# Patient Record
Sex: FEMALE | Race: WHITE
[De-identification: names, ages, dates, MRNs, and addresses within clinical notes are randomized per-mention and may not be internally consistent; named-entity substitution may affect disease eponyms.]

---

## 2017-03-17 ENCOUNTER — HOSPITAL ENCOUNTER (OUTPATIENT)
Dept: HOSPITAL 80 - BMCIMAGING | Age: 42
End: 2017-03-17
Attending: INTERNAL MEDICINE
Payer: COMMERCIAL

## 2017-03-17 DIAGNOSIS — J40: Primary | ICD-10-CM

## 2017-03-23 ENCOUNTER — HOSPITAL ENCOUNTER (OUTPATIENT)
Dept: HOSPITAL 80 - BRMIMAGING | Age: 42
End: 2017-03-23
Attending: INTERNAL MEDICINE
Payer: COMMERCIAL

## 2017-03-23 DIAGNOSIS — Z12.31: Primary | ICD-10-CM

## 2017-03-23 PROCEDURE — G0202 SCR MAMMO BI INCL CAD: HCPCS

## 2017-03-30 ENCOUNTER — HOSPITAL ENCOUNTER (OUTPATIENT)
Dept: HOSPITAL 80 - FIMAGING | Age: 42
End: 2017-03-30
Attending: INTERNAL MEDICINE
Payer: COMMERCIAL

## 2017-03-30 DIAGNOSIS — R92.8: Primary | ICD-10-CM

## 2017-03-30 PROCEDURE — G0206 DX MAMMO INCL CAD UNI: HCPCS

## 2019-05-01 ENCOUNTER — HOSPITAL ENCOUNTER (OUTPATIENT)
Dept: HOSPITAL 80 - FIMAGING | Age: 44
End: 2019-05-01
Attending: INTERNAL MEDICINE
Payer: COMMERCIAL

## 2019-05-01 DIAGNOSIS — Z12.31: Primary | ICD-10-CM

## 2020-12-03 ENCOUNTER — TRANSFERRED RECORDS (OUTPATIENT)
Dept: HEALTH INFORMATION MANAGEMENT | Facility: CLINIC | Age: 45
End: 2020-12-03

## 2020-12-09 ENCOUNTER — TRANSFERRED RECORDS (OUTPATIENT)
Dept: HEALTH INFORMATION MANAGEMENT | Facility: CLINIC | Age: 45
End: 2020-12-09

## 2021-07-29 ENCOUNTER — OFFICE VISIT (OUTPATIENT)
Dept: CARDIOLOGY | Facility: CLINIC | Age: 46
End: 2021-07-29
Payer: COMMERCIAL

## 2021-07-29 ENCOUNTER — TELEPHONE (OUTPATIENT)
Dept: CARDIOLOGY | Facility: CLINIC | Age: 46
End: 2021-07-29

## 2021-07-29 VITALS
HEART RATE: 69 BPM | WEIGHT: 171 LBS | DIASTOLIC BLOOD PRESSURE: 74 MMHG | BODY MASS INDEX: 29.19 KG/M2 | SYSTOLIC BLOOD PRESSURE: 114 MMHG | HEIGHT: 64 IN

## 2021-07-29 DIAGNOSIS — R00.2 PALPITATIONS: Primary | ICD-10-CM

## 2021-07-29 PROCEDURE — 93000 ELECTROCARDIOGRAM COMPLETE: CPT | Performed by: INTERNAL MEDICINE

## 2021-07-29 PROCEDURE — 99204 OFFICE O/P NEW MOD 45 MIN: CPT | Performed by: INTERNAL MEDICINE

## 2021-07-29 RX ORDER — NADOLOL 20 MG/1
10 TABLET ORAL DAILY
COMMUNITY
End: 2021-07-29

## 2021-07-29 ASSESSMENT — MIFFLIN-ST. JEOR: SCORE: 1400.65

## 2021-07-29 NOTE — PROGRESS NOTES
Service Date: 07/29/2021    HISTORY OF PRESENT ILLNESS:  I saw Ms. Grey for establishment of cardiac care.  She is a 46-year-old white female who was working in [place] in the last 15 years.  She recently presented to her family physician with a report of anxiety episodes.  The patient does have a family history of coronary artery disease in her father.  Her primary care doctor referred her for a stress test.  During the treadmill test, she was found to have a nonsustained ventricular tachycardia.  For that reason, she was sent for Cardiology  consultation.  The cardiologist believed that her arrhythmia was unlikely to be catecholamine-dependent VT, but ended up having her going through genetic testing anyways.  Her genetic testing was negative for catecholamine-dependent VT.  Historically, she did not have syncope or near syncope.  She does not, in fact, feel any palpitations.  Her anxiety problem has resolved when she is using acupuncture at the present time.    She had echocardiography in [place] that was unremarkable.  She was discharged on nadolol and the patient has been complaining of severe fatigue.    Her past medical history is negative for any other chronic medical problems.  She does not smoke or abuse alcohol.    PHYSICAL EXAMINATION:    VITAL SIGNS:  Blood pressure was 114/74, heart rate 69 beats per minute, body weight 171 pounds.  HEENT:  The eyes and ENT were unremarkable.  LUNGS:  Clear.  CARDIAC:  Rhythm was regular and heart sounds were normal with no murmur.  ABDOMEN:  Abdominal exam showed mild obesity.  EXTREMITIES:  There was no pedal edema.    EKG showed normal sinus rhythm.    ASSESSMENT AND RECOMMENDATIONS:  The patient, Ms. Grey is a 46-year-old white female who had an exercise-induced, but nonsustained VT.  She has had negative genetic testing for catecholamine-dependent VT.  She has not had clinical symptoms of ventricular tachycardia.  Her anxiety symptoms have resolved with  acupuncture.  She has severe side effects from nadolol.  I have agreed for her to discontinue nadolol.  I ordered a repeat stress test in about 1 week.  It is my knowledge that the patient's repeat exercise testing in [place] while on nadolol did not show ventricular arrhythmia.  Per patient request and her previous plan, she will have a cardiac MRI for evaluation of the cardiac structure and function.  I plan to see her for followup in about 1-2 months.    Nicky Maldonado MD        D: 2021   T: 2021   MT: ELBERT    Name:     PATRICE HOPKINS  MRN:      [id number]        Account:      [id number]   :      1975           Service Date: 2021       Document: [id number]

## 2021-07-29 NOTE — LETTER
7/29/2021    Physician No Ref-Primary  No address on file    RE: Janet Grey       Dear Colleague,    I had the pleasure of seeing Janet Grey in the [facility].    HPI and Plan:   See dictation    Orders Placed This Encounter   Procedures     MRI Cardiac w/contrast     Follow-Up with Electrophysiologist     EKG 12-lead complete w/read - Clinics (future- to be scheduled)     Exercise Stress Test - Adult       Orders Placed This Encounter   Medications     DISCONTD: nadolol (CORGARD) 20 MG tablet     Sig: Take 10 mg by mouth daily       Medications Discontinued During This Encounter   Medication Reason     nadolol (CORGARD) 20 MG tablet          Encounter Diagnosis   Name Primary?     Palpitations Yes       CURRENT MEDICATIONS:  No current outpatient medications on file.       ALLERGIES   No Known Allergies    PAST MEDICAL HISTORY:  Past Medical History:   Diagnosis Date     Anxiety      Paroxysmal ventricular tachycardia (H)        PAST SURGICAL HISTORY:  No past surgical history on file.    FAMILY HISTORY:  Family History   Problem Relation Age of Onset     Coronary Artery Disease Father      Coronary Artery Disease Paternal Grandmother        SOCIAL HISTORY:  Social History     Socioeconomic History     Marital status: Single     Spouse name: None     Number of children: None     Years of education: None     Highest education level: None   Occupational History     None   Tobacco Use     Smoking status: Never Smoker     Smokeless tobacco: Never Used   Substance and Sexual Activity     Alcohol use: Yes     Alcohol/week: 6.0 standard drinks     Types: 6 Glasses of wine per week     Drug use: Never     Sexual activity: None   Other Topics Concern     None   Social History Narrative     None     Social Determinants of Health     Financial Resource Strain:      Difficulty of Paying Living Expenses:    Food Insecurity:      Worried About  "Running Out of Food in the Last Year:      Ran Out of Food in the Last Year:    Transportation Needs:      Lack of Transportation (Medical):      Lack of Transportation (Non-Medical):    Physical Activity:      Days of Exercise per Week:      Minutes of Exercise per Session:    Stress:      Feeling of Stress :    Social Connections:      Frequency of Communication with Friends and Family:      Frequency of Social Gatherings with Friends and Family:      Attends Temple Services:      Active Member of Clubs or Organizations:      Attends Club or Organization Meetings:      Marital Status:    Intimate Partner Violence:      Fear of Current or Ex-Partner:      Emotionally Abused:      Physically Abused:      Sexually Abused:        Review of Systems:  Skin:  Negative       Eyes:  Negative      ENT:  Negative      Respiratory:  Negative       Cardiovascular:    Positive for;heaviness    Gastroenterology: Positive for heartburn    Genitourinary:  Negative      Musculoskeletal:  Negative      Neurologic:  Negative      Psychiatric:  Positive for anxiety    Heme/Lymph/Imm:  Negative      Endocrine:  Negative        Physical Exam:  Vitals: /74   Pulse 69   Ht 1.626 m (5' 4\")   Wt 77.6 kg (171 lb)   BMI 29.35 kg/m      Constitutional:  cooperative, alert and oriented, well developed, well nourished, in no acute distress        Skin:  warm and dry to the touch, no apparent skin lesions or masses noted          Head:  normocephalic, no masses or lesions        Eyes:  pupils equal and round, conjunctivae and lids unremarkable, sclera white, no xanthalasma, EOMS intact, no nystagmus        Lymph:No Cervical lymphadenopathy present     ENT:  no pallor or cyanosis, dentition good        Neck:           Respiratory:  normal breath sounds, clear to auscultation, normal A-P diameter, normal symmetry, normal respiratory excursion, no use of accessory muscles         Cardiac: regular rhythm, normal S1/S2, no S3 or S4, " apical impulse not displaced, no murmurs, gallops or rubs                                                         GI:  abdomen soft, non-tender, BS normoactive, no mass, no HSM, no bruits        Extremities and Muscular Skeletal:  no deformities, clubbing, cyanosis, erythema observed              Neurological:  no gross motor deficits        Psych:  Alert and Oriented x 3        CC  No referring provider defined for this encounter.                  Thank you for allowing me to participate in the care of your patient.      Sincerely,     Nicky Maldonado MD     [facility]  cc:   No referring provider defined for this encounter.

## 2021-07-29 NOTE — TELEPHONE ENCOUNTER
Pt has OV today with Dr. Maldonado. Scheduling notes say OV is to discuss exercise induced VT. Pt previously seen at [facility] in [place]. Notes available in Care Everywhere, but we will need strips from her previous testing.     Sent stat fax to [facility] Records at [phone] asking for all cardiac testing WITH STRIPS in the past year, and specifically asked for stress test with strips from 12/2020, monitor results with strips from 12/2020, and possible repeat stress test from 6/2020, and possible cardiac MRI from 6/2020.

## 2021-07-29 NOTE — PROGRESS NOTES
HPI and Plan:   See dictation    Orders Placed This Encounter   Procedures     MRI Cardiac w/contrast     Follow-Up with Electrophysiologist     EKG 12-lead complete w/read - Clinics (future- to be scheduled)     Exercise Stress Test - Adult       Orders Placed This Encounter   Medications     DISCONTD: nadolol (CORGARD) 20 MG tablet     Sig: Take 10 mg by mouth daily       Medications Discontinued During This Encounter   Medication Reason     nadolol (CORGARD) 20 MG tablet          Encounter Diagnosis   Name Primary?     Palpitations Yes       CURRENT MEDICATIONS:  No current outpatient medications on file.       ALLERGIES   No Known Allergies    PAST MEDICAL HISTORY:  Past Medical History:   Diagnosis Date     Anxiety      Paroxysmal ventricular tachycardia (H)        PAST SURGICAL HISTORY:  No past surgical history on file.    FAMILY HISTORY:  Family History   Problem Relation Age of Onset     Coronary Artery Disease Father      Coronary Artery Disease Paternal Grandmother        SOCIAL HISTORY:  Social History     Socioeconomic History     Marital status: Single     Spouse name: None     Number of children: None     Years of education: None     Highest education level: None   Occupational History     None   Tobacco Use     Smoking status: Never Smoker     Smokeless tobacco: Never Used   Substance and Sexual Activity     Alcohol use: Yes     Alcohol/week: 6.0 standard drinks     Types: 6 Glasses of wine per week     Drug use: Never     Sexual activity: None   Other Topics Concern     None   Social History Narrative     None     Social Determinants of Health     Financial Resource Strain:      Difficulty of Paying Living Expenses:    Food Insecurity:      Worried About Running Out of Food in the Last Year:      Ran Out of Food in the Last Year:    Transportation Needs:      Lack of Transportation (Medical):      Lack of Transportation (Non-Medical):    Physical Activity:      Days of Exercise per Week:       "Minutes of Exercise per Session:    Stress:      Feeling of Stress :    Social Connections:      Frequency of Communication with Friends and Family:      Frequency of Social Gatherings with Friends and Family:      Attends Zoroastrianism Services:      Active Member of Clubs or Organizations:      Attends Club or Organization Meetings:      Marital Status:    Intimate Partner Violence:      Fear of Current or Ex-Partner:      Emotionally Abused:      Physically Abused:      Sexually Abused:        Review of Systems:  Skin:  Negative       Eyes:  Negative      ENT:  Negative      Respiratory:  Negative       Cardiovascular:    Positive for;heaviness    Gastroenterology: Positive for heartburn    Genitourinary:  Negative      Musculoskeletal:  Negative      Neurologic:  Negative      Psychiatric:  Positive for anxiety    Heme/Lymph/Imm:  Negative      Endocrine:  Negative        Physical Exam:  Vitals: /74   Pulse 69   Ht 1.626 m (5' 4\")   Wt 77.6 kg (171 lb)   BMI 29.35 kg/m      Constitutional:  cooperative, alert and oriented, well developed, well nourished, in no acute distress        Skin:  warm and dry to the touch, no apparent skin lesions or masses noted          Head:  normocephalic, no masses or lesions        Eyes:  pupils equal and round, conjunctivae and lids unremarkable, sclera white, no xanthalasma, EOMS intact, no nystagmus        Lymph:No Cervical lymphadenopathy present     ENT:  no pallor or cyanosis, dentition good        Neck:           Respiratory:  normal breath sounds, clear to auscultation, normal A-P diameter, normal symmetry, normal respiratory excursion, no use of accessory muscles         Cardiac: regular rhythm, normal S1/S2, no S3 or S4, apical impulse not displaced, no murmurs, gallops or rubs                                                         GI:  abdomen soft, non-tender, BS normoactive, no mass, no HSM, no bruits        Extremities and Muscular Skeletal:  no deformities, " clubbing, cyanosis, erythema observed              Neurological:  no gross motor deficits        Psych:  Alert and Oriented x 3        CC  No referring provider defined for this encounter.

## 2021-07-30 ENCOUNTER — TELEPHONE (OUTPATIENT)
Dept: CARDIOLOGY | Facility: CLINIC | Age: 46
End: 2021-07-30

## 2021-07-30 NOTE — TELEPHONE ENCOUNTER
Patient called needing clarification regarding stopping nadolol.  Reviewed OV from 7/29 and patient is to discontinue medication no taper needed.  CHARLIE Felix

## 2021-08-06 ENCOUNTER — HOSPITAL ENCOUNTER (OUTPATIENT)
Dept: CARDIOLOGY | Facility: CLINIC | Age: 46
Discharge: HOME | End: 2021-08-06
Attending: INTERNAL MEDICINE | Admitting: INTERNAL MEDICINE
Payer: COMMERCIAL

## 2021-08-06 DIAGNOSIS — R00.2 PALPITATIONS: ICD-10-CM

## 2021-08-06 PROCEDURE — 93018 CV STRESS TEST I&R ONLY: CPT | Performed by: INTERNAL MEDICINE

## 2021-08-06 PROCEDURE — 93016 CV STRESS TEST SUPVJ ONLY: CPT | Performed by: INTERNAL MEDICINE

## 2021-08-06 PROCEDURE — 93017 CV STRESS TEST TRACING ONLY: CPT

## 2021-08-11 ENCOUNTER — HOSPITAL ENCOUNTER (OUTPATIENT)
Dept: CARDIOLOGY | Facility: CLINIC | Age: 46
Discharge: HOME | End: 2021-08-11
Attending: INTERNAL MEDICINE | Admitting: INTERNAL MEDICINE
Payer: COMMERCIAL

## 2021-08-11 DIAGNOSIS — R00.2 PALPITATIONS: ICD-10-CM

## 2021-08-11 PROCEDURE — 255N000002 HC RX 255 OP 636: Performed by: INTERNAL MEDICINE

## 2021-08-11 PROCEDURE — 75561 CARDIAC MRI FOR MORPH W/DYE: CPT | Mod: 26 | Performed by: INTERNAL MEDICINE

## 2021-08-11 PROCEDURE — 75561 CARDIAC MRI FOR MORPH W/DYE: CPT

## 2021-08-11 PROCEDURE — A9585 GADOBUTROL INJECTION: HCPCS | Performed by: INTERNAL MEDICINE

## 2021-08-11 RX ORDER — GADOBUTROL 604.72 MG/ML
10 INJECTION INTRAVENOUS ONCE
Status: COMPLETED | OUTPATIENT
Start: 2021-08-11 | End: 2021-08-11

## 2021-08-11 RX ADMIN — GADOBUTROL 10 ML: 604.72 INJECTION INTRAVENOUS at 09:29

## 2021-08-31 ENCOUNTER — OFFICE VISIT (OUTPATIENT)
Dept: CARDIOLOGY | Facility: CLINIC | Age: 46
End: 2021-08-31
Payer: COMMERCIAL

## 2021-08-31 VITALS
HEIGHT: 64 IN | BODY MASS INDEX: 29.37 KG/M2 | WEIGHT: 172 LBS | DIASTOLIC BLOOD PRESSURE: 82 MMHG | HEART RATE: 62 BPM | SYSTOLIC BLOOD PRESSURE: 117 MMHG

## 2021-08-31 DIAGNOSIS — R00.2 PALPITATIONS: ICD-10-CM

## 2021-08-31 PROCEDURE — 99213 OFFICE O/P EST LOW 20 MIN: CPT | Performed by: INTERNAL MEDICINE

## 2021-08-31 ASSESSMENT — MIFFLIN-ST. JEOR: SCORE: 1405.19

## 2021-08-31 NOTE — LETTER
8/31/2021    Physician No Ref-Primary  No address on file    RE: Janet Grey       Dear Colleague,    I had the pleasure of seeing Janet Grey in the [facility].    HPI and Plan:   See dictation    No orders of the defined types were placed in this encounter.      No orders of the defined types were placed in this encounter.      There are no discontinued medications.      Encounter Diagnosis   Name Primary?     Palpitations        CURRENT MEDICATIONS:  No current outpatient medications on file.       ALLERGIES   No Known Allergies    PAST MEDICAL HISTORY:  Past Medical History:   Diagnosis Date     Anxiety      Paroxysmal ventricular tachycardia (H)        PAST SURGICAL HISTORY:  No past surgical history on file.    FAMILY HISTORY:  Family History   Problem Relation Age of Onset     Coronary Artery Disease Father      Coronary Artery Disease Paternal Grandmother        SOCIAL HISTORY:  Social History     Socioeconomic History     Marital status: Single     Spouse name: None     Number of children: None     Years of education: None     Highest education level: None   Occupational History     None   Tobacco Use     Smoking status: Never Smoker     Smokeless tobacco: Never Used   Substance and Sexual Activity     Alcohol use: Yes     Alcohol/week: 6.0 standard drinks     Types: 6 Glasses of wine per week     Drug use: Never     Sexual activity: None   Other Topics Concern     None   Social History Narrative     None     Social Determinants of Health     Financial Resource Strain:      Difficulty of Paying Living Expenses:    Food Insecurity:      Worried About Running Out of Food in the Last Year:      Ran Out of Food in the Last Year:    Transportation Needs:      Lack of Transportation (Medical):      Lack of Transportation (Non-Medical):    Physical Activity:      Days of Exercise per Week:      Minutes of Exercise per Session:   "  Stress:      Feeling of Stress :    Social Connections:      Frequency of Communication with Friends and Family:      Frequency of Social Gatherings with Friends and Family:      Attends Latter-day Services:      Active Member of Clubs or Organizations:      Attends Club or Organization Meetings:      Marital Status:    Intimate Partner Violence:      Fear of Current or Ex-Partner:      Emotionally Abused:      Physically Abused:      Sexually Abused:        Review of Systems:  Skin:  Negative       Eyes:  Negative      ENT:  Negative      Respiratory:  Negative       Cardiovascular:    Positive for;heaviness    Gastroenterology: Positive for heartburn    Genitourinary:  Negative      Musculoskeletal:  Negative      Neurologic:  Negative      Psychiatric:  Positive for anxiety    Heme/Lymph/Imm:  Negative      Endocrine:  Negative        Physical Exam:  Vitals: /82   Pulse 62   Ht 1.626 m (5' 4\")   Wt 78 kg (172 lb)   BMI 29.52 kg/m      Constitutional:  cooperative, alert and oriented, well developed, well nourished, in no acute distress        Skin:  warm and dry to the touch, no apparent skin lesions or masses noted          Head:  normocephalic, no masses or lesions        Eyes:  pupils equal and round, conjunctivae and lids unremarkable, sclera white, no xanthalasma, EOMS intact, no nystagmus        Lymph:No Cervical lymphadenopathy present     ENT:  no pallor or cyanosis, dentition good        Neck:           Respiratory:  normal breath sounds, clear to auscultation, normal A-P diameter, normal symmetry, normal respiratory excursion, no use of accessory muscles         Cardiac: regular rhythm, normal S1/S2, no S3 or S4, apical impulse not displaced, no murmurs, gallops or rubs                                                         GI:  abdomen soft, non-tender, BS normoactive, no mass, no HSM, no bruits        Extremities and Muscular Skeletal:  no deformities, clubbing, cyanosis, erythema observed "              Neurological:  no gross motor deficits        Psych:  Alert and Oriented x 3        CC  Nicky Maldonado MD  [address]  [address]  [place] [id number]                  Thank you for allowing me to participate in the care of your patient.      Sincerely,     Nicky Maldonado MD     [facility]  cc:   Nicky Maldonado MD  [address]  [address]  [place] [id number]

## 2021-08-31 NOTE — PROGRESS NOTES
HPI and Plan:   See dictation    No orders of the defined types were placed in this encounter.      No orders of the defined types were placed in this encounter.      There are no discontinued medications.      Encounter Diagnosis   Name Primary?     Palpitations        CURRENT MEDICATIONS:  No current outpatient medications on file.       ALLERGIES   No Known Allergies    PAST MEDICAL HISTORY:  Past Medical History:   Diagnosis Date     Anxiety      Paroxysmal ventricular tachycardia (H)        PAST SURGICAL HISTORY:  No past surgical history on file.    FAMILY HISTORY:  Family History   Problem Relation Age of Onset     Coronary Artery Disease Father      Coronary Artery Disease Paternal Grandmother        SOCIAL HISTORY:  Social History     Socioeconomic History     Marital status: Single     Spouse name: None     Number of children: None     Years of education: None     Highest education level: None   Occupational History     None   Tobacco Use     Smoking status: Never Smoker     Smokeless tobacco: Never Used   Substance and Sexual Activity     Alcohol use: Yes     Alcohol/week: 6.0 standard drinks     Types: 6 Glasses of wine per week     Drug use: Never     Sexual activity: None   Other Topics Concern     None   Social History Narrative     None     Social Determinants of Health     Financial Resource Strain:      Difficulty of Paying Living Expenses:    Food Insecurity:      Worried About Running Out of Food in the Last Year:      Ran Out of Food in the Last Year:    Transportation Needs:      Lack of Transportation (Medical):      Lack of Transportation (Non-Medical):    Physical Activity:      Days of Exercise per Week:      Minutes of Exercise per Session:    Stress:      Feeling of Stress :    Social Connections:      Frequency of Communication with Friends and Family:      Frequency of Social Gatherings with Friends and Family:      Attends Uatsdin Services:      Active Member of Clubs or  "Organizations:      Attends Club or Organization Meetings:      Marital Status:    Intimate Partner Violence:      Fear of Current or Ex-Partner:      Emotionally Abused:      Physically Abused:      Sexually Abused:        Review of Systems:  Skin:  Negative       Eyes:  Negative      ENT:  Negative      Respiratory:  Negative       Cardiovascular:    Positive for;heaviness    Gastroenterology: Positive for heartburn    Genitourinary:  Negative      Musculoskeletal:  Negative      Neurologic:  Negative      Psychiatric:  Positive for anxiety    Heme/Lymph/Imm:  Negative      Endocrine:  Negative        Physical Exam:  Vitals: /82   Pulse 62   Ht 1.626 m (5' 4\")   Wt 78 kg (172 lb)   BMI 29.52 kg/m      Constitutional:  cooperative, alert and oriented, well developed, well nourished, in no acute distress        Skin:  warm and dry to the touch, no apparent skin lesions or masses noted          Head:  normocephalic, no masses or lesions        Eyes:  pupils equal and round, conjunctivae and lids unremarkable, sclera white, no xanthalasma, EOMS intact, no nystagmus        Lymph:No Cervical lymphadenopathy present     ENT:  no pallor or cyanosis, dentition good        Neck:           Respiratory:  normal breath sounds, clear to auscultation, normal A-P diameter, normal symmetry, normal respiratory excursion, no use of accessory muscles         Cardiac: regular rhythm, normal S1/S2, no S3 or S4, apical impulse not displaced, no murmurs, gallops or rubs                                                         GI:  abdomen soft, non-tender, BS normoactive, no mass, no HSM, no bruits        Extremities and Muscular Skeletal:  no deformities, clubbing, cyanosis, erythema observed              Neurological:  no gross motor deficits        Psych:  Alert and Oriented x 3        CC  Nicky Maldonado MD  [address]  [address]  [place] [id number]              "

## 2021-08-31 NOTE — PROGRESS NOTES
Service Date: 2021    HISTORY OF PRESENT ILLNESS:  I saw Ms. Grey for followup of abnormal treadmill test.  She is a 46-year-old white female who had an anxiety while she was in [place] previously.  The family doctor sent her for a stress test because of a family history of coronary artery disease.      During the stress test, she was found to have a nonsustained VT.  For that reason, she was sent for genetic testing that was negative.  The patient was put on a beta blocker.  When she came to see me in July, I recommended discontinuation of the beta blocker and recommended a repeat treadmill test.      She had above average exercise tolerance during the repeat treadmill test.  There was no evidence of myocardial ischemia.  There is no ventricular tachycardia either.      The patient had a cardiac MRI that was reported to be negative.      She is doing well at the present time.    PHYSICAL EXAMINATION:  Blood pressure was 117/82, heart rate 62 beats per minute, body weight 172 pounds.  The cardiovascular examination showed no remarkable abnormalities.    ASSESSMENT AND RECOMMENDATIONS:  Ms. Grey is a 46-year-old white female who had nonsustained VT during a treadmill test in [place].  There is no family history of sudden cardiac death or cardiac arrhythmia.  She has a normal cardiac MRI and the repeat stress test is normal.      At this point, I do not recommend beta blocker and she has no restriction for activities.  She can contact our office for questions or concerns, but she does not need routine Cardiology visit.        Nicky Maldonado MD        D: 2021   T: 2021   MT: ELBERT    Name:     PATRICE GREY  MRN:      [id number]        Account:      [id number]   :      1975           Service Date: 2021       Document: [id number]

## 2021-09-11 ENCOUNTER — HEALTH MAINTENANCE LETTER (OUTPATIENT)
Age: 46
End: 2021-09-11

## 2021-11-04 ENCOUNTER — TELEPHONE (OUTPATIENT)
Dept: CARDIOLOGY | Facility: CLINIC | Age: 46
End: 2021-11-04

## 2021-11-04 NOTE — TELEPHONE ENCOUNTER
11/4/21,Pt called to report she is establishing care w PCP today at [facility]. She is wanting her PCP to be able to see Dr Cathy parikh.  Explained that records should be viewable through Care EveryWhere. She will check w PCP to see if they are viewable. If not, pt will call [facility] ( phone # provided) . Also asked pt to check w [facility] to see if there is anything she needs to sign to release her records from them to us.  Pt voiced understanding and agreement with plan.   Levy 1050 am

## 2022-08-13 ENCOUNTER — HEALTH MAINTENANCE LETTER (OUTPATIENT)
Age: 47
End: 2022-08-13

## 2022-10-30 ENCOUNTER — HEALTH MAINTENANCE LETTER (OUTPATIENT)
Age: 47
End: 2022-10-30

## 2023-06-21 ENCOUNTER — TRANSCRIBE ORDERS (OUTPATIENT)
Dept: OTHER | Age: 48
End: 2023-06-21

## 2023-06-21 DIAGNOSIS — Z76.89 ENCOUNTER FOR WEIGHT MANAGEMENT: Primary | ICD-10-CM

## 2023-07-05 ASSESSMENT — SLEEP AND FATIGUE QUESTIONNAIRES
HOW LIKELY ARE YOU TO NOD OFF OR FALL ASLEEP WHILE SITTING AND TALKING TO SOMEONE: WOULD NEVER DOZE
HOW LIKELY ARE YOU TO NOD OFF OR FALL ASLEEP WHILE LYING DOWN TO REST IN THE AFTERNOON WHEN CIRCUMSTANCES PERMIT: SLIGHT CHANCE OF DOZING
HOW LIKELY ARE YOU TO NOD OFF OR FALL ASLEEP WHILE SITTING QUIETLY AFTER LUNCH WITHOUT ALCOHOL: WOULD NEVER DOZE
HOW LIKELY ARE YOU TO NOD OFF OR FALL ASLEEP WHEN YOU ARE A PASSENGER IN A CAR FOR AN HOUR WITHOUT A BREAK: WOULD NEVER DOZE
HOW LIKELY ARE YOU TO NOD OFF OR FALL ASLEEP IN A CAR, WHILE STOPPED FOR A FEW MINUTES IN TRAFFIC: WOULD NEVER DOZE
HOW LIKELY ARE YOU TO NOD OFF OR FALL ASLEEP WHILE SITTING AND READING: WOULD NEVER DOZE
HOW LIKELY ARE YOU TO NOD OFF OR FALL ASLEEP WHILE WATCHING TV: SLIGHT CHANCE OF DOZING
HOW LIKELY ARE YOU TO NOD OFF OR FALL ASLEEP WHILE SITTING INACTIVE IN A PUBLIC PLACE: WOULD NEVER DOZE

## 2023-07-05 NOTE — PROGRESS NOTES
"Janet is a 48 year old who is being evaluated via a billable video visit.      The patient has been notified of following:     \"This video visit will be conducted via a call between you and your physician/provider. We have found that certain health care needs can be provided without the need for an in-person physical exam.  This service lets us provide the care you need with a video conversation.  If a prescription is necessary we can send it directly to your pharmacy.  If lab work is needed we can place an order for that and you can then stop by our lab to have the test done at a later time.    Video visits are billed at different rates depending on your insurance coverage.  Please reach out to your insurance provider with any questions.    If during the course of the call the physician/provider feels a video visit is not appropriate, you will not be charged for this service.\"    Patient has given verbal consent for Video visit? Yes    How would you like to obtain your AVS? MyChart    If the video visit is dropped, the invitation should be resent by: MY CHART    Will anyone else be joining your video visit? No    I    Video-Visit Details    Type of service:  Video Visit    Video Start Time: 9:04    Video End Time: 9:53    Originating Location (pt. Location): Home    Distant Location (provider location):  Home     Platform used for Video Visit: [facility] Management Consult      PATIENT:  Janet Grey  MRN:         [id number]  :         1975  DARREN:         2023        Dear Physician No Ref-Primary,    I had the pleasure of seeing your patient, Janet Grey. Full intake/assessment was done to determine barriers to weight loss success and develop a treatment plan. Janet Grey is a 48 year old female interested in treatment of medical problems associated with excess weight. She has a height of 5' 4\", a weight of 180 lbs 0 oz, and the calculated " "Body mass index is 30.9 kg/m .     Has noticed slowly increasing weight. Is very active      ASSESSMENT & PLAN:    Problem List Items Addressed This Visit    None  Visit Diagnoses     Class 1 obesity due to excess calories with serious comorbidity and body mass index (BMI) of 30.0 to 30.9 in adult    -  Primary    Encounter for weight management               PROGRAM OVERVIEW  Reviewed options at [facility] including provider visits, dietician, 24 week healthy lifestyle program, health coaching, food supplements, Get Moving program, and psychological support.  All questions about weight loss program were answered.     MEDICATIONS:  We discussed healthy habits to assist with weight loss. We reviewed medications associated with weight gain. We discussed the role of pharmacological agents in the treatment of obesity and the \"off-label\" use of medications in this practice. We reviewed medication that may assist with weight loss. Indications, contraindications, risks/benefits, and potential side effects were discussed.  Phentermine will be prescribed after reviewing patients recent annual visit notes with vitals. Discussed that medications must always be used together with lifestyle changes such as improvements in diet choices, portion control and establishing and maintaining a regular exercise program.     AOM Considerations:  Phentermine: Option #1. Patient prefers this to start  Topiramate: Good option   GLP-1: Saxenda is #2 option.  Patient has no history of pancreatitis. Patient has no personal or family history of medullary thyroid carcinoma or MEN2.     Naltrexone: option   Wellbutrin: option  Metformin:  Option          PATIENT INSTRUCTIONS:  1. Set up a nurse only visit at the closest [facility] to get weight, blood pressure and pulse taken. Please ask that they forward the results. - OR get notes and vitals from [facility]  2. Meet with dietitian  3. Decrease alcohol          Follow up: " "Return to clinic in 3 months    52 minutes spent on the date of the encounter doing chart review, review of test results, patient visit and documentation         She has the following co-morbidities:        7/5/2023     1:51 PM   --   I have the following health issues associated with obesity None of the above   I have the following symptoms associated with obesity None of the above             7/5/2023     1:51 PM   Referring Provider   Please name the provider who referred you to Medical Weight Management  If you do not know, please answer \"I Don't Know\" Maribel Heller MD           7/5/2023     1:51 PM   Weight History   How concerned are you about your weight? Very Concerned   I became overweight As an Adult   The following factors have contributed to my weight gain Stress   I have tried the following methods to lose weight Watching Portions or Calories    Exercise    Atkins-type Diet (Low Carb/High Protein)    Meal Replacements   My lowest weight since age 18 was 140   My highest weight since age 18 was 205   The most weight I have ever lost was (lbs) 40   I have the following family history of obesity/being overweight Many of my relatives are overweight   How has your weight changed over the last year? Gained   How many pounds? 15      Drinks at least 70 oz water daily. Green tea. Drinks approx 10 glasses of wine.  Eats at 11 am. - snacks - then makes dinner at night.        7/5/2023     1:51 PM   Diet Recall Review with Patient   If you do eat lunch, what types of food do you typically eat? Eggs, Turkey Sausage and toast   If you do eat supper, what types of food do you typically eat? Chicken, potato   If you do snack, what types of food do you typically eat? Crackers and cheese - nuts   How many glasses of juice do you drink in a typical day? 0   How many of glasses of milk do you drink in a typical day? 0   How many 8oz glasses of sugar containing drinks such as David-Aid/sweet tea do you drink in a day? 0 "   How many cans/bottles of sugar pop/soda/tea/sports drinks do you drink in a day? 0   How many cans/bottles of diet pop/soda/tea or sports drink do you drink in a day? 3   How often do you have a drink of alcohol? 4 or More Times a Week   If you do drink, how many drinks might you have in a day? 1 or 2           7/5/2023     1:51 PM   Eating Habits   Generally, my meals include foods like these bread, pasta, rice, potatoes, corn, crackers, sweet dessert, pop, or juice Almost Everyday   Generally, my meals include foods like these fried meats, brats, burgers, french fries, pizza, cheese, chips, or ice cream Once a Week   Eat fast food (like McDonalds, [employer], Taco Bell) Never   Eat at a buffet or sit-down restaurant A Few Times a Week   Eat most of my meals in front of the TV or computer A Few Times a Week   Often skip meals, eat at random times, have no regular eating times Almost Everyday   Rarely sit down for a meal but snack or graze throughout Almost Everyday   Eat extra snacks between meals A Few Times a Week   Eat most of my food at the end of the day Everyday   Eat in the middle of the night or wake up at night to eat Never   Eat extra snacks to prevent or correct low blood sugar Never   Eat to prevent acid reflux or stomach pain Never   Worry about not having enough food to eat Never   I eat when I am depressed Never   I eat when I am stressed A Few Times a Week   I eat when I am bored A Few Times a Week   I eat when I am anxious A Few Times a Week   I eat when I am happy or as a reward A Few Times a Week   I feel hungry all the time even if I just have eaten Almost Everyday   Feeling full is important to me A Few Times a Week   I finish all the food on my plate even if I am already full Almost Everyday   I can't resist eating delicious food or walk past the good food/smell Almost Everyday   I eat/snack without noticing that I am eating Once a Week   I eat when I am preparing the meal Almost Everyday    I eat more than usual when I see others eating Less Than Weekly   I have trouble not eating sweets, ice cream, cookies, or chips if they are around the house A Few Times a Week   I think about food all day Less Than Weekly   What foods, if any, do you crave? Cheese           7/5/2023     1:51 PM   Amount of Food   I feel out of control when eating Never   I eat a large amount of food, like a loaf of bread, a box of cookies, a pint/quart of ice cream, all at once Never   I eat a large amount of food even when I am not hungry Never   I eat rapidly Never   I eat alone because I feel embarrassed and do not want others to see how much I have eaten Never   I eat until I am uncomfortably full Monthly   I feel bad, disgusted, or guilty after I overeat Monthly           7/5/2023     1:51 PM   Activity/Exercise History   How much of a typical 12 hour day do you spend sitting? Most of the Day   How much of a typical 12 hour day do you spend lying down? Less Than Half the Day   How much of a typical day do you spend walking/standing? Less Than Half the Day   How many hours (not including work) do you spend on the TV/Video Games/Computer/Tablet/Phone? 2-3 Hours   How many times a week are you active for the purpose of exercise? 6-7 Times a Week   How many total minutes do you spend doing some activity for the purpose of exercising when you exercise? More Than 30 Minutes       PAST MEDICAL HISTORY:  Past Medical History:   Diagnosis Date     Anxiety      Paroxysmal ventricular tachycardia (H)            7/5/2023     1:51 PM   Work/Social History Reviewed With Patient   My employment status is Full-Time   My job is Sales   How much of your job is spent on the computer or phone? 100%   How many hours do you spend commuting to work daily? 0   What is your marital status? Single   Who does the food shopping? Me       Social History     Tobacco Use     Smoking status: Never     Smokeless tobacco: Never   Substance Use Topics      Alcohol use: Yes     Alcohol/week: 6.0 standard drinks of alcohol     Types: 6 Glasses of wine per week     Drug use: Never            7/5/2023     1:51 PM   Mental Health History Reviewed With Patient   Have you ever been physically or sexually abused? No   How often in the past 2 weeks have you felt little interest or pleasure in doing things? Not at all   Over the past 2 weeks how often have you felt down, depressed, or hopeless? Not at all           7/5/2023     1:51 PM   Sleep History Reviewed With Patient   How many hours do you sleep at night? 8       MEDICATIONS:   Current Outpatient Medications   Medication Sig Dispense Refill     levonorgestrel (MIRENA) 52 MG (20 mcg/day) IUD 52 mg by Intrauterine route       multivitamin w/minerals (MULTI-VITAMIN) tablet Take 1 tablet by mouth         ALLERGIES:   No Known Allergies    ROS:  HEENT  H/O glaucoma:  no  Cardiovascular  CAD:   No - had ECHO 2021 WNL  Palpitations:   no  HTN:    No    Gastrointestinal  GERD:   Every now and then  - maybe twice monthly   Constipation:   no  Liver Dz:   no  H/O Pancreatitis:  no  H/O Gallbladder Dz: no  Psychiatric  Moods Stable:  no  Anxiety:   no  Depression:  no  Bipolar:  no  H/O ETOH/Drug Use: no  H/O eating disorder: no  Endocrine  PMH/FMH of MTC or MEN2:  no  Neurologic:  H/O seizures:   no  Headaches:  no  Memory Impairment:  no    H/O kidney stones:  no  Kidney disease:  no  Current birth control:  Mirena      LABS/RECORDS REVIEWED:  WBC   Date Value Ref Range Status   06/30/2007 8.1 4.0 - 11.0 10e9/L Final     Hemoglobin   Date Value Ref Range Status   06/30/2007 13.7 11.7 - 15.7 g/dL Final     Hematocrit   Date Value Ref Range Status   06/30/2007 40.0 35.0 - 47.0 % Final     MCV   Date Value Ref Range Status   06/30/2007 90 78 - 100 fl Final     Platelet Count   Date Value Ref Range Status   06/30/2007 256 150 - 450 10e9/L Final         BP Readings from Last 6 Encounters:   08/31/21 117/82   07/29/21 114/74      "  Pulse Readings from Last 6 Encounters:   08/31/21 62   07/29/21 69       PHYSICAL EXAM:  Ht 5' 4\" (1.626 m)   Wt 180 lb (81.6 kg)   BMI 30.90 kg/m    GENERAL: Healthy, alert and no distress  EYES: Eyes grossly normal to inspection.  No discharge or erythema, or obvious scleral/conjunctival abnormalities.  RESP: No audible wheeze, cough, or visible cyanosis.  No visible retractions or increased work of breathing.    SKIN: Visible skin clear. No significant rash, abnormal pigmentation or lesions.  NEURO: Cranial nerves grossly intact.  Mentation and speech appropriate for age.  PSYCH: Mentation appears normal, affect normal/bright, judgement and insight intact, normal speech and appearance well-groomed.    COUNSELING:   Reviewed obesity as a chronic disease and comprehensive management stratagies.      We discussed Bariatric Basics including:  -eating 3 meals daily  -eating protein first  -eating slowly, chewing food well  -avoiding/limiting calorie containing beverages  -limiting carbohydrates and changing to whole grains  -limiting restaurant or cafeteria eating to twice a week or less    We discussed the importance of restorative sleep and stress management in maintaining a healthy weight.  We discussed insulin resistance and glycemic index as it relates to appetite and weight control.   We discussed the importance of physical activity including cardiovascular and strength training in maintaining a healthier weight and explored viable options.  Patient education of above written in AVS.      Sincerely,    Avinash Beverly PA-C      "

## 2023-07-06 ENCOUNTER — VIRTUAL VISIT (OUTPATIENT)
Dept: SURGERY | Facility: CLINIC | Age: 48
End: 2023-07-06
Attending: STUDENT IN AN ORGANIZED HEALTH CARE EDUCATION/TRAINING PROGRAM
Payer: COMMERCIAL

## 2023-07-06 VITALS — BODY MASS INDEX: 30.73 KG/M2 | WEIGHT: 180 LBS | HEIGHT: 64 IN

## 2023-07-06 DIAGNOSIS — E66.09 CLASS 1 OBESITY DUE TO EXCESS CALORIES WITH SERIOUS COMORBIDITY AND BODY MASS INDEX (BMI) OF 30.0 TO 30.9 IN ADULT: Primary | ICD-10-CM

## 2023-07-06 DIAGNOSIS — Z76.89 ENCOUNTER FOR WEIGHT MANAGEMENT: ICD-10-CM

## 2023-07-06 DIAGNOSIS — E66.811 CLASS 1 OBESITY DUE TO EXCESS CALORIES WITH SERIOUS COMORBIDITY AND BODY MASS INDEX (BMI) OF 30.0 TO 30.9 IN ADULT: Primary | ICD-10-CM

## 2023-07-06 PROCEDURE — 99204 OFFICE O/P NEW MOD 45 MIN: CPT | Mod: VID | Performed by: PHYSICIAN ASSISTANT

## 2023-07-06 RX ORDER — MULTIPLE VITAMINS W/ MINERALS TAB 9MG-400MCG
1 TAB ORAL
COMMUNITY

## 2023-07-06 NOTE — PATIENT INSTRUCTIONS
"Nice to talk with you today! Thank you for allowing me the privilege of caring for you.   We hope we provided you with the excellent service you deserve.     To ensure the quality of our services you may receive a patient satisfaction survey from an independent monitoring company.  The greatest compliment you can give is \"Likely to Recommend\"      Below is our plan we discussed.-  MARLEY Da Silva      1. Set up a nurse only visit at the closest [facility] to get weight, blood pressure and pulse taken. Please ask that they forward the results. - OR get notes and vitals from [facility]  2. Meet with dietitian  3. Decrease alcohol       Please call [phone] and schedule a follow up with Avinash Beverly PA-C in 3 months.  If you need to reach me sooner you can do so by calling [phone].    Have a great day!                                 .  Here is some information about the medication we discussed.  Please have your blood pressure and pulse checked 7-10 days after starting and let clinic know if your blood pressure goes above 140/90 or pulse greater than 100 bpm!        MEDICATION STARTED AT THIS APPOINTMENT    We are starting Phentermine. Take one tablet in the morning. Contact the nurse via enymotion or call [phone] if you have any questions or concerns. (Do not stop taking it if you don't think it's working. For some people it works without them knowing it.)    Phentermine is being prescribed because you identified hunger as one of the main causes for your extra weight.      Our patients on Phentermine find that they:    >feel less hunger    >find it easier to push the plate away   >have an easier time eating less    For some of our patients, these feelings are very real and immediate. For other patients, the feelings are less obvious. They don't feel much of a change but find they've lost weight. Like all weight loss medications, Phentermine  works best when you help it work. This means:  1. Having " less tempting high calorie (fattening) food around the house or office. (For people with strong cravings this is very important.)   2. Staying away from situations or people that may trigger your cravings .   3. Eating out only one time or less each week.  4. Eating your meals at a table with the TV or computer off.    Side-effects. Phentermine is generally well tolerated. The main side-effects we see are feelings of racing pulse or rapid heart beat. Some people can get an elevated blood pressure. Because of this we may have you come back within a week or so of starting the medication for a blood pressure check.         In order to get refills of this or any medication we prescribe you must be seen in the medical weight mgmt clinic every 2-3 months.         SAXENDA (liraglutide)    Saxenda is a GLP-1 (Glucagon-like Peptide-1) medication.One of the ways it works is by slowing down the rate that food leaves your stomach. You feel klein and will eat less. It also helps regulate hormones that can help improve your blood sugars.    Dosing for this medication:   Week 1- Inject 0.6 mg daily  Week 2- Inject 1.2 mg daily  Week 3- Inject 1.8 mg daily (If hunger and portions controlled stay at this dose)  Week 4- Inject 2.4 mg daily  Week 5 and thereafter- Inject 3.0 mg daily    Side effects of GLP- Medications include: The most common side effects are all GI related and consist of: nausea, heartburn, constipation, diarrhea, burping, or gassiness. Patients are advised to eat slowly and less, and nausea typically passes if people can stick it out.     The risk of pancreatitis (inflammation of the pancreas) has been associated with this type of medication, but is very rare.  If you have had pancreatitis in the past, this medication may not be for you. Please let us know about any past history of pancreas problems.  Symptoms of pancreatitis include: Pain in your upper stomach area which may travel to your back and be worse after  eating. Your stomach area may be tender to the touch.  You may have vomiting or nausea and/or have a fever. If you should develop any of these symptoms, stop the medication and contact your primary care doctor. They will do a blood test to check for pancreatitis.       This medication is usually not covered by insurance and can be quite expensive. Sometimes a prior authorization is required, which may take up to 1-2 weeks for an insurance company to make a decision if they will cover the medication. Please be patient, you will be notified after a decision has been made.     (Do not stop taking it if you don't think it's working. For some people it works without them knowing it.)     In order to get refills of this or any medication we prescribe you must be seen in the medical weight mgmt clinic every 2-4 months.

## 2023-07-07 ENCOUNTER — TELEPHONE (OUTPATIENT)
Dept: SURGERY | Facility: CLINIC | Age: 48
End: 2023-07-07
Payer: COMMERCIAL

## 2023-07-13 ENCOUNTER — VIRTUAL VISIT (OUTPATIENT)
Dept: SURGERY | Facility: CLINIC | Age: 48
End: 2023-07-13
Payer: COMMERCIAL

## 2023-07-13 DIAGNOSIS — E66.09 CLASS 1 OBESITY DUE TO EXCESS CALORIES WITH SERIOUS COMORBIDITY AND BODY MASS INDEX (BMI) OF 30.0 TO 30.9 IN ADULT: Primary | ICD-10-CM

## 2023-07-13 DIAGNOSIS — E66.811 CLASS 1 OBESITY DUE TO EXCESS CALORIES WITH SERIOUS COMORBIDITY AND BODY MASS INDEX (BMI) OF 30.0 TO 30.9 IN ADULT: Primary | ICD-10-CM

## 2023-07-13 PROCEDURE — 97802 MEDICAL NUTRITION INDIV IN: CPT | Mod: VID

## 2023-07-13 NOTE — PATIENT INSTRUCTIONS
Christopher Gonzales-  [place] to the [facility], [place]! It was great to visit with you and learn about your interest in weight loss. Below are the goals we discussed.  Goals:  Start strength training classes 2X per week   Eat breakfast within1 hour of waking then meals every 4-5 hours.  Reduce alcohol by 50%    Nutrition Educational Materials:  Create a Plate (How to Build A Healthy Meal)  [email or web address]  Tips for Weight Loss and Weight Management  [email or web address]  Building a Healthy Relationship with Food  [email or web address]       Please call [phone] to schedule your next visit with a Dietitian in 1 month.  Thanks!  Tk Preciado RD, LD  [facility]

## 2023-07-13 NOTE — PROGRESS NOTES
Virtual Visit Details    Type of service:  Video Visit     Originating Location (pt. Location): Home  Distant Location (provider location):  Off-site  Platform used for Video Visit: [facility]     MEDICAL WEIGHT LOSS INITIAL EVALUATION  DIAGNOSIS:  Class I Obesity    NUTRITION HISTORY:    Diet Recall Review with Patient   If you do eat lunch, what types of food do you typically eat? Eggs with spinach, red pepper, mushrooms, cheese, Turkey Sausage and toast @ 11 am (up at 7 am   If you do eat supper, what types of food do you typically eat? Chicken, potato or dines out  @ 6-7 mg   If you do snack, what types of food do you typically eat? Crackers and cheese , nuts @ 3 pm  Sometimes snacks after dinner- nuts or pop corn, rice krispie bars   How many glasses of juice do you drink in a typical day? 0   How many of glasses of milk do you drink in a typical day? 0   How many 8oz glasses of sugar containing drinks such as David-Aid/sweet tea do you drink in a day? 0   How many cans/bottles of sugar pop/soda/tea/sports drinks do you drink in a day? 0   How many cans/bottles of diet pop/soda/tea or sports drink do you drink in a day? 3   How often do you have a drink of alcohol? 4 or More Times a Week   If you do drink, how many drinks might you have in a day? 1 or 2-wine     Beverages: 70 oz water, unsweetened green tea,   Dining out: 2-3X per week-pizza or sandwich or entree salad or fish or steak  Binge eating: denies  Emotional Eating: yes-bored,   Night time eating: denies  Exercise: walks 2 dogs 3-4 miles/day  Additional Information: Patient is frustrated with not being able to manage her weight (up ~ 15-18 pounds in 2 years). Moved back to [place] about 2 years ago from [place]. Lives alone and does not mind cooking. Does not eat salmon. Likes veggies, but is not goo with cooking them. Travels 2 times per quarter. Biggest craving is for salty foods.     MEDICATION FOR WEIGHT LOSS:  none at this  "time    ANTHROPOMETRICS:  Height: 64\"  Weight: 172 lb  patient reported   BMI:  30.9 kg/m2  NUTRITION DIAGNOSIS:   Overweight/Obese class I related to overeating and poor lifestyle habits as evidence by patient's subjective statements and  BMI of 30.9 kg/m2   NUTRITION INTERVENTIONS  Nutrition Prescription:  Recommend modified energy- nutrient intake  Implementation:  Nutrition Education (Content):    Discussed plate guidelines     Discussed emotional eating    Reviewed healthy snacking and beverage choices    Provided: Tips for Weight Loss and Weight Management, Building a Healthy Relationship with Food, Plate guidelines      Nutrition Education (Application):     Patient to practice goals as stated below    Patient verbalizes understanding of diet asking about cutting back on alcohol    Anticipate good compliance    Goals:  Start strength training classes 2X per week   Eat breakfast within1 hour of waking then meals every 4-5 hours.  Reduce alcohol by 50%      FOLLOW UP AND MONITORING:    Other  - follow up in 4 weeks.     TIME SPENT WITH PATIENT:   30 minutes   Tk Preciado RD, LD  [facility], [place]  "

## 2023-07-19 ENCOUNTER — TELEPHONE (OUTPATIENT)
Dept: SURGERY | Facility: CLINIC | Age: 48
End: 2023-07-19
Payer: COMMERCIAL

## 2023-07-19 VITALS
WEIGHT: 179.8 LBS | HEIGHT: 64 IN | BODY MASS INDEX: 30.7 KG/M2 | DIASTOLIC BLOOD PRESSURE: 74 MMHG | SYSTOLIC BLOOD PRESSURE: 120 MMHG

## 2023-07-19 NOTE — TELEPHONE ENCOUNTER
"Pt called to inquire if vitals were received from [facility].    Pt seen 7/7/23 by provider and is waiting for phentermine rx.    Was able to print the office visit from Clinic [facility] for 6/15/23.  Could see that pt's BP was 120/74   Ht 5'4\"  Wt 179.8#  No HR.  Pt states is able to get from [employer] worn daily.  Will ask provider if okay with [employer] HR or if needs to go to clinic.  Then will get back to pt.  Griselda Wetzel, MS, RD, RN    "

## 2023-07-20 DIAGNOSIS — E66.811 CLASS 1 OBESITY DUE TO EXCESS CALORIES WITH SERIOUS COMORBIDITY AND BODY MASS INDEX (BMI) OF 30.0 TO 30.9 IN ADULT: Primary | ICD-10-CM

## 2023-07-20 DIAGNOSIS — E66.09 CLASS 1 OBESITY DUE TO EXCESS CALORIES WITH SERIOUS COMORBIDITY AND BODY MASS INDEX (BMI) OF 30.0 TO 30.9 IN ADULT: Primary | ICD-10-CM

## 2023-07-20 RX ORDER — PHENTERMINE HYDROCHLORIDE 15 MG/1
15 CAPSULE ORAL EVERY MORNING
Qty: 90 CAPSULE | Refills: 0 | Status: SHIPPED | OUTPATIENT
Start: 2023-07-20

## 2023-07-21 ENCOUNTER — TELEPHONE (OUTPATIENT)
Dept: SURGERY | Facility: CLINIC | Age: 48
End: 2023-07-21
Payer: COMMERCIAL

## 2023-07-21 NOTE — TELEPHONE ENCOUNTER
"Received \"transmission failed to send\" notification via [employer] [place] for phentermine.  Per pharmacy, they did not receive the phentermine script sent yesterday by [facility].  Spoke with pharmacist [place] to call in a verbal order to pharmacy.  Accepted the verbal and stated that the script should be processed and ready by this afternoon.    Sandra Trejo RN on 7/21/2023 at 9:49 AM    "

## 2023-09-03 ENCOUNTER — HEALTH MAINTENANCE LETTER (OUTPATIENT)
Age: 48
End: 2023-09-03

## 2023-11-12 ENCOUNTER — HEALTH MAINTENANCE LETTER (OUTPATIENT)
Age: 48
End: 2023-11-12

## 2024-07-28 ENCOUNTER — HOSPITAL ENCOUNTER (EMERGENCY)
Facility: CLINIC | Age: 49
Discharge: HOME | End: 2024-07-28
Attending: EMERGENCY MEDICINE | Admitting: EMERGENCY MEDICINE
Payer: COMMERCIAL

## 2024-07-28 VITALS
OXYGEN SATURATION: 97 % | TEMPERATURE: 97.1 F | RESPIRATION RATE: 16 BRPM | SYSTOLIC BLOOD PRESSURE: 150 MMHG | DIASTOLIC BLOOD PRESSURE: 91 MMHG | HEART RATE: 78 BPM

## 2024-07-28 DIAGNOSIS — S61.211A LACERATION OF LEFT INDEX FINGER WITHOUT FOREIGN BODY WITHOUT DAMAGE TO NAIL, INITIAL ENCOUNTER: ICD-10-CM

## 2024-07-28 PROCEDURE — 99282 EMERGENCY DEPT VISIT SF MDM: CPT

## 2024-07-28 PROCEDURE — 12001 RPR S/N/AX/GEN/TRNK 2.5CM/<: CPT | Mod: F1

## 2024-07-28 ASSESSMENT — ACTIVITIES OF DAILY LIVING (ADL): ADLS_ACUITY_SCORE: 35

## 2024-07-28 NOTE — ED TRIAGE NOTES
Pt arrives through triage c/o L index finger lac from a new hand saw today, bleeding controlled, unknown last tetanus, ABCD intact.       Triage Assessment (Adult)       Row Name 07/28/24 1233 07/28/24 1232       Triage Assessment    Airway WDL WDL WDL       Respiratory WDL    Respiratory WDL WDL WDL       Skin Circulation/Temperature WDL    Skin Circulation/Temperature WDL X  L hand lac X  R index finger lac       Cardiac WDL    Cardiac WDL WDL WDL       Peripheral/Neurovascular WDL    Peripheral Neurovascular WDL WDL WDL       Cognitive/Neuro/Behavioral WDL    Cognitive/Neuro/Behavioral WDL WDL WDL

## 2024-07-28 NOTE — ED PROVIDER NOTES
Emergency Department Note      History of Present Illness     Chief Complaint   Laceration      HPI   Janet Grey is a 49 year old female here for evaluation of a laceration. Patient states that she was cutting a tree limb today with a new saw when she accidentally cut her left index finger. Bleeding is controlled. Patient is right handed.     Independent Historian   None    Review of External Notes   None    Past Medical History     Medical History and Problem List   Anxiety      Medications   Phentermine   Mirena       Physical Exam     Patient Vitals for the past 24 hrs:   BP Temp Temp src Pulse Resp SpO2   07/28/24 1235 (!) 150/91 97.1  F (36.2  C) Temporal 78 16 97 %     Physical Exam    MSK:  Normal movement through the elbow, wrist, and fingers without tendonous deficit.    SKIN:  Warm and dry with strong radial pulse and normal capillary refill. There is a 2 cm laceration to the dorsum of the left hand across the proximal phalanx. Possible gaping. No bleeding.     NEURO:  5/5 strength through the fingers/wrist/elbow.  Normal sensation through the radial/ulnar/median nerve distributions.    PSYCH:  Normal affect    Diagnostics     Lab Results   Labs Ordered and Resulted from Time of ED Arrival to Time of ED Departure - No data to display    Imaging   No orders to display         Independent Interpretation   None    ED Course      Medications Administered   Medications - No data to display    Procedures   Procedures       Laceration Repair      Procedure: Laceration Repair    Indication: Laceration    Consent: Verbal    Tetanus status reviewed    Location: Left L second (index) finger    Length: 2.5 cm    Preparation: Irrigation with Sterile Saline.    Anesthesia/Sedation: Bupivacaine - 0.5%      Treatment/Exploration: Wound explored, no foreign bodies found     Closure: The wound was closed with one layer. Skin/superficial layer was closed with 1 x 4-0 Nylon using Interrupted sutures.       Patient Status: The patient tolerated the procedure well: Yes. There were no complications.      Discussion of Management   None    ED Course   ED Course as of 07/29/24 1115   Sun Jul 28, 2024   1246 I obtained history and examined the patient as noted above.    1313 Performed laceration repair.       Optional/Additional Documentation  None    Medical Decision Making / Diagnosis     CMS Diagnoses: None    MIPS       None    [facility]   Janet Grey is a 49 year old female presenting to us after suffering a laceration to her left index finger while using a hack saw on a tree.  There is no evidence of foreign body or tendon involvement.  The wound was anesthetized, cleaned, and sutured as above.  She will follow-up with her primary team for suture removal in 10 days.  She will return to us for any other emergent concerns.    Disposition   The patient was discharged.     Diagnosis     ICD-10-CM    1. Laceration of left index finger without foreign body without damage to nail, initial encounter  S61.211A            Discharge Medications   Discharge Medication List as of 7/28/2024  1:42 PM            Scribe Disclosure:  I, Cynthia Burnett, am serving as a scribe at 12:50 PM on 7/28/2024 to document services personally performed by Trierweiler, Chad A, MD based on my observations and the provider's statements to me.        Trierweiler, Chad A, MD  07/29/24 1113

## 2024-10-01 ENCOUNTER — TELEPHONE (OUTPATIENT)
Dept: CARDIOLOGY | Facility: CLINIC | Age: 49
End: 2024-10-01
Payer: COMMERCIAL

## 2024-10-01 NOTE — TELEPHONE ENCOUNTER
Spoke to patient to discuss upcoming appt who self referred herself as she has noted low HR and palpitations in the last couple of weeks.  Reports HR in the 30's when awake.  Reports dizziness with episodes.  Asked patient if she has been evaluated for her symptoms.  Patient indicated she has not been seen recently.  Suggested she start with her PCP to get work up  including a monitor so we have some data to present to Dr Saldaña.  She agreed with plan and will be calling her PCP to get an appointment for evaluation.  She will not cancel appt with Dr Saldaña until her work up is completed with her PCP and if not needed will cancel at that time.  CHARLIE Felix

## 2024-10-01 NOTE — TELEPHONE ENCOUNTER
Message left for patient to obtain more information about reason for appointment.  Waiting call back from patient.  CHARLIE Felix

## 2024-10-21 NOTE — TELEPHONE ENCOUNTER
Spoke to pt who will not see her PCP until the first of November. Pt at this time will cancel her visit. Did remind pt that if she does sill have palpitations, that wearing a monitor is the best way to figure out what pt is having. Pt stated understanding. OV canceled. Sd

## 2024-12-28 ENCOUNTER — HEALTH MAINTENANCE LETTER (OUTPATIENT)
Age: 49
End: 2024-12-28

## 2025-04-09 ENCOUNTER — OFFICE VISIT (OUTPATIENT)
Dept: SURGERY | Facility: CLINIC | Age: 50
End: 2025-04-09
Payer: COMMERCIAL

## 2025-04-23 ENCOUNTER — OFFICE VISIT (OUTPATIENT)
Dept: SURGERY | Facility: CLINIC | Age: 50
End: 2025-04-23
Payer: COMMERCIAL

## 2025-04-23 VITALS
BODY MASS INDEX: 31.24 KG/M2 | SYSTOLIC BLOOD PRESSURE: 137 MMHG | OXYGEN SATURATION: 99 % | HEART RATE: 63 BPM | DIASTOLIC BLOOD PRESSURE: 84 MMHG | WEIGHT: 183 LBS | HEIGHT: 64 IN

## 2025-04-23 DIAGNOSIS — E66.811 CLASS 1 OBESITY DUE TO EXCESS CALORIES WITH SERIOUS COMORBIDITY AND BODY MASS INDEX (BMI) OF 31.0 TO 31.9 IN ADULT: Primary | ICD-10-CM

## 2025-04-23 DIAGNOSIS — M54.50 LUMBAR PAIN: ICD-10-CM

## 2025-04-23 DIAGNOSIS — E66.09 CLASS 1 OBESITY DUE TO EXCESS CALORIES WITH SERIOUS COMORBIDITY AND BODY MASS INDEX (BMI) OF 31.0 TO 31.9 IN ADULT: Primary | ICD-10-CM

## 2025-04-23 PROCEDURE — 99214 OFFICE O/P EST MOD 30 MIN: CPT | Performed by: PHYSICIAN ASSISTANT

## 2025-04-23 PROCEDURE — 3079F DIAST BP 80-89 MM HG: CPT | Performed by: PHYSICIAN ASSISTANT

## 2025-04-23 PROCEDURE — 3075F SYST BP GE 130 - 139MM HG: CPT | Performed by: PHYSICIAN ASSISTANT

## 2025-04-23 PROCEDURE — G2211 COMPLEX E/M VISIT ADD ON: HCPCS | Performed by: PHYSICIAN ASSISTANT

## 2025-04-23 NOTE — PATIENT INSTRUCTIONS
"Nice to talk with you today! Thank you for allowing me the privilege of caring for you.   We hope we provided you with the excellent service you deserve.     To ensure the quality of our services you may receive a patient satisfaction survey from an independent monitoring company.  The greatest compliment you can give is \"Likely to Recommend\"      Below is our plan we discussed.-  MARLEY Da Silva      Breakfast every morning  Start zepbound    Please schedule a follow up with Avinash Beverly PA-C in 3 months.  If you need to reach me sooner you can do so by calling [phone].    Have a great day!       Tirzepatide (Zepbound, Mounjaro) can effect on absorption concentrations of oral contraceptives with initiating tirzepatide and with dose increases.   and that the longer the patient is on a particular dose the less effective this has.  There may be decreased effectiveness of your birth control while starting and with dose adjustments.  Use backup forms of birth control if necessary.         Zepbound (Tirzepatide)    Zepbound (Tirzepatide): is an injectable prescription medicine recently FDA approved for adults with obesity or overweight with an additional condition affected by their weight.      It is given as a shot once a week. It activates the body's receptors for GIP (glucose-dependent insulinotropic polypeptide) and GLP-1 (glucagon-like peptide-1) receptor, two naturally occurring hormones that help tell the brain that you are full. It also works is by slowing down how quickly food leaves your stomach.     You will start to feel klein more quickly, notice portion changes and eat less often between meals.  Patients are advised to eat slowly and purposefully. Give yourself less portions. You may find after starting this medication you have a new point of fullness. Maintain consistent eating schedule with meals +/- snacks and get in good hydration.    Dosing:  Initial dose: 2.5 mg injected subcutaneously once weekly " for 4 weeks  Further dose changes can include: increase to 5 mg once weekly for 4 weeks, then 7.5 mg once weekly for 4 weeks, then 10 mg once weekly for 4 weeks then 12.5 mg once weekly for 4 weeks, then 15 mg (maximum dose) once weekly.    Dose changes are based on how you are tolerating the current dose, what benefits you are seeing at the current dose and if improvement in effectiveness of the drug is needed. The goal is to find the dose that works best for you with the least amount of side effects. You may find you reach this at a lower dose than the maximum dose.     Side effects: Common side effects include nausea, Heartburn,diarrhea, constipation. This is worse when starting the medication and with dose dose escalation.  It does improve with time. Stay adequately hydrated and eat small portions to decrease the risk of side effects.     The risk of pancreatitis (inflammation of the pancreas) has been associated with this type of medication, but is very rare.  If you have had pancreatitis in the past, this medication may not be for you. If you experience persistent severe abdominal pain (sometimes radiating to the back potentially accompanied by vomiting and have a fever), stop the medication and contact your provider immediately for assessment. They will do a blood test to check for pancreatitis.       Caution:   Tirzepatide (Zepbound, Mounjaro) May decrease effectiveness of your oral birth control while starting and with dose adjustments.  Use backup forms of birth control if necessary.      For any questions or concerns please send a [employer] message to our team or call our weight management call center at [phone] during regular business hours.         GLP-1 TIPS TO HELP WITH SIDE EFFECTS     GLP medications work by slowing down gastric emptying and making you feel klein longer. As a result, your may experience nausea, heartburn, and constipation.  These improve over time as your body gets used to the  medication.     For Nausea/Heartburn:  Eat small, protein focused meals throughout the day  Stay hydrated.-The medication can decrease your drive for thirst  Eat slowly. STOP at the first sign of satisfaction  Eat at regular intervals, letting hours pass without eating- can cause nausea.  AVOID foods that are high in fat and sugar .      For constipation:  Stay hydrated and make sure you are moving.    Miralax 1 scoop/packet up daily    Ducosate Sodium 1 pill twice daily (stool softener)   4 oz Prune juice or Plum juice daily   Milk of Magnesia as needed 1x weekly. Do not use daily!   Doculax 1 pill as needed 1-2x weekly   Smooth Move Tea as needed   Magnesium 400-500 mg daily    To decrease nausea/heartburn when starting a GLP-1:  Take 20-30 minutes for your meals. STOP at the first sign of satisfaction  Eat 3 small meals daily. (Use a salad size plate for meals)  AVOID foods that are high in fat and sugar

## 2025-04-23 NOTE — PROGRESS NOTES
"  Return Medical Weight Management Note         Janet Grey  MRN:  [id number]  :  1975  DARREN:  2025      Dear Physician Adriana Ref-Primary,    I had the pleasure of seeing your patient Janet Grey. She is a 50 year old female who I am continuing to see for treatment of obesity related to:        2023     1:51 PM   --   I have the following health issues associated with obesity None of the above   I have the following symptoms associated with obesity None of the above       Assessment   Problem List Items Addressed This Visit    None  Visit Diagnoses       Class 1 obesity due to excess calories with serious comorbidity and body mass index (BMI) of 31.0 to 31.9 in adult    -  Primary    Relevant Medications    tirzepatide-Weight Management (ZEPBOUND) 5 MG/0.5ML prefilled pen    tirzepatide-Weight Management (ZEPBOUND) 2.5 MG/0.5ML prefilled pen    Lumbar pain        Relevant Medications    tirzepatide-Weight Management (ZEPBOUND) 5 MG/0.5ML prefilled pen    tirzepatide-Weight Management (ZEPBOUND) 2.5 MG/0.5ML prefilled pen               PLAN/DISCUSSION:  We discussed the role of pharmacological agents in the treatment of obesity and the \"off-label\" use of medications in this practice. We discussed the risks and benefits of each. We discussed indications, contraindications, potential side effects, and estimated costs of each. Discussed that medications must always be used together with lifestyle changes such as improvements in diet choices, portion control and establishing and maintaining a regular exercise program.     Discussed zepbound.   Patient has no history of pancreatitis. Patient has no personal or family history of medullary thyroid carcinoma or MEN2.   If zepbound is not an option than topiramate was briefly discussed.     Tirzepatide (Zepbound, Mounjaro) can effect on absorption concentrations of oral contraceptives with initiating tirzepatide and with dose increases.  "  and that the longer the patient is on a particular dose the less effective this has.  There may be decreased effectiveness of your birth control while starting and with dose adjustments.  Use backup forms of birth control if necessary.       Plan:  Breakfast every morning  Start zepbound    FOLLOW-UP:  3 months        SUBJECTIVE/OBJECTIVE:  Patient seen once 7/2023 and was prescribed phentermine. Felt jittery so stopped taking. Would like to discuss other WLM options.   Had a benign pancreatic cyst. Per patient pancreatic specialist is not concerned about patient taking GLP-1 as cyst is not cancerous.     Antiobesity medication history:  Phentermine (Lomaira, Adipex) Rx provided at lnitial visit., Got jittery   Phentermine/Topiramate (Qsymia)    Bupropion/Naltrexone (Contrave) Took for 3.5 weeks developed tinnitus   Liraglutide (Saxenda)    Semaglutide (Wegovy)  Patient has no history of pancreatitis. Patient has no personal or family history of medullary thyroid carcinoma or MEN2.     Tirzepatide (Zepbound)    Orlistat (Xenical/Geraldo)    Off-Label Medications for Obesity  Semaglutide (Ozempic)    Tirzepatide (Mounjaro)    Bupropion (Wellbutrin)    Metformin(Glucophage) Good option    Topiramate (Topamax) Good option    Naltexone Option              4/22/2025     2:37 PM   Weight Loss Medication History Reviewed With Patient   If you are not taking a weight loss medication that was prescribed to you, please indicate why: I did not like the side effects   Are you having any side effects from the weight loss medication that we have prescribed you? No       Recent diet changes: Drinks 70 oz water daily   Not great at eating breakfast  L: yogurt and some peanuts  D: Shredded chicken tacos - had one with some chips  Has 2 daily     Recent exercise/activity changes: walks everyday for 2-3 miles. Does strength training twice weekly       CURRENT WEIGHT:   183 lbs 0 oz    Initial Weight (lbs): 180 lbs  Last Visits Weight:  180 lb (81.6 kg)  Cumulative weight loss (lbs): -3  Weight Loss Percentage: -1.67%        4/22/2025     2:37 PM   Changes and Difficulties   I have made the following changes to my diet since my last visit: I have more protein   With regards to my diet, I am still struggling with: Snacking   I have made the following changes to my activity/exercise since my last visit: Work out daily         MEDICATIONS:   Current Outpatient Medications   Medication Sig Dispense Refill    levonorgestrel (MIRENA) 52 MG (20 mcg/day) IUD 52 mg by Intrauterine route      multivitamin w/minerals (MULTI-VITAMIN) tablet Take 1 tablet by mouth      tirzepatide-Weight Management (ZEPBOUND) 2.5 MG/0.5ML prefilled pen Inject 0.5 mLs (2.5 mg) subcutaneously every 7 days. 2 mL 0    tirzepatide-Weight Management (ZEPBOUND) 5 MG/0.5ML prefilled pen Inject 0.5 mLs (5 mg) subcutaneously every 7 days. 2 mL 1    phentermine (ADIPEX-P) 15 MG capsule Take 1 capsule (15 mg) by mouth every morning 90 capsule 0         ROS: 4/23/25  General  Fatigue: No  Sleep Quality: OK  HEENT  H/O glaucoma:            no  Cardiovascular  CAD:                            No - had ECHO 2021 WNL  Palpitations:                no  HTN:                            No  Gastrointestinal  GERD:                         No longer  Constipation:               no  Liver Dz:                      no - has a cyst on liver and pancreas. Benign - has had repeat MRIs for 18 months and stable. Will monitor annually   H/O Pancreatitis:         no  H/O Gallbladder Dz:    no  Psychiatric  Moods Stable:             Yes  Anxiety:                       no  Depression:                 no  Bipolar:                        no  H/O ETOH/Drug Use: no  H/O eating disorder:    no  Endocrine  PMH/FMH of MTC or MEN2:  no  Neurologic:  H/O seizures:              no  Headaches:                 no  Memory Impairment:   no    H/O kidney stones:     no  Kidney disease:          no  Current birth control:    "Mirena      PHYSICAL EXAM:  Objective    /84   Pulse 63   Ht 5' 4\" (1.626 m)   Wt 183 lb (83 kg)   SpO2 99%   BMI 31.41 kg/m    GENERAL: Healthy, alert and no distress  EYES: Eyes grossly normal to inspection.  No discharge or erythema, or obvious scleral/conjunctival abnormalities.  RESP: No audible wheeze, cough, or visible cyanosis.  No visible retractions or increased work of breathing.    SKIN: Visible skin clear. No significant rash, abnormal pigmentation or lesions.  NEURO: Cranial nerves grossly intact.  Mentation and speech appropriate for age.  PSYCH: Mentation appears normal, affect normal/bright, judgement and insight intact, normal speech and appearance well-groomed.        Sincerely,    Avinash Beverly PA-C    30 minutes spent on the date of the encounter doing chart review, review of test results, patient visit and documentation     "

## 2025-04-24 ENCOUNTER — TELEPHONE (OUTPATIENT)
Dept: SURGERY | Facility: CLINIC | Age: 50
End: 2025-04-24
Payer: COMMERCIAL

## 2025-04-24 NOTE — TELEPHONE ENCOUNTER
Prior Authorization Retail Medication Request    Medication/Dose:tirzepatide-Weight Management (ZEPBOUND) 2.5 MG/0.5ML prefilled pen  tirzepatide-Weight Management (ZEPBOUND) 5 MG/0.5ML prefilled pen    Diagnosis and ICD code (if different than what is on RX):  Class 1 obesity due to excess calories with serious comorbidity and body mass index (BMI) of 31.0 to 31.9 in adult [E66.811, E66.09, Z68.31]  - Primary  Lumbar pain [M54.50]     New/renewal/insurance change PA/secondary ins. PA: New    Previously Tried and Failed:  diet, exercise, and lifestyle    Rationale:  Patient seen once 7/2023 and was prescribed phentermine. Felt jittery so stopped taking.   Had a benign pancreatic cyst. Per patient pancreatic specialist is not concerned about patient taking GLP-1 as cyst is not cancerous.   Antiobesity medication history:  Phentermine (Lomaira, Adipex) Rx provided at lnitial visit., Got jittery   Phentermine/Topiramate (Qsymia)     Bupropion/Naltrexone (Contrave) Took for 3.5 weeks developed tinnitus   Liraglutide (Saxenda)     Semaglutide (Wegovy)  Patient has no history of pancreatitis. Patient has no personal or family history of medullary thyroid carcinoma or MEN2.     Tirzepatide (Zepbound)     Orlistat (Xenical/Geraldo)            CURRENT WEIGHT:   183 lbs 0 oz     Initial Weight (lbs): 180 lbs  Last Visits Weight: 180 lb (81.6 kg)  Cumulative weight loss (lbs): -3  Weight Loss Percentage: -1.67%    Insurance   Primary: [employer]  Insurance ID:  [id number]       Pharmacy Information (if different than what is on RX)  Name:  [id number] [place] [id number] AT [facility] [address]  Phone:  [phone]   Fax:[phone]

## 2025-04-28 NOTE — TELEPHONE ENCOUNTER
Prior Authorization Approval    Medication: ZEPBOUND 2.5 MG/0.5ML SC SOAJ  Authorization Effective Date: 4/23/2025  Authorization Expiration Date: 12/20/2025  Approved Dose/Quantity: 2ML PER 28 DAYS  Reference #:     Insurance Company: [employer] Non-Specialty PA's - Phone [phone] Fax [phone]  Expected CoPay: $    CoPay Card Available:      Financial Assistance Needed: NA  Which Pharmacy is filling the prescription:    Pharmacy Notified: No, approved before order was released  Patient Notified: Yes, via [facility]

## 2025-06-16 DIAGNOSIS — E66.811 CLASS 1 OBESITY DUE TO EXCESS CALORIES WITH SERIOUS COMORBIDITY AND BODY MASS INDEX (BMI) OF 31.0 TO 31.9 IN ADULT: ICD-10-CM

## 2025-06-16 DIAGNOSIS — E66.09 CLASS 1 OBESITY DUE TO EXCESS CALORIES WITH SERIOUS COMORBIDITY AND BODY MASS INDEX (BMI) OF 31.0 TO 31.9 IN ADULT: ICD-10-CM

## 2025-06-16 DIAGNOSIS — M54.50 LUMBAR PAIN: ICD-10-CM

## 2025-06-16 RX ORDER — TIRZEPATIDE 5 MG/.5ML
INJECTION, SOLUTION SUBCUTANEOUS
Qty: 2 ML | Refills: 1 | Status: SHIPPED | OUTPATIENT
Start: 2025-06-16

## 2025-07-09 ENCOUNTER — OFFICE VISIT (OUTPATIENT)
Dept: SURGERY | Facility: CLINIC | Age: 50
End: 2025-07-09
Payer: COMMERCIAL

## 2025-07-09 ENCOUNTER — LAB (OUTPATIENT)
Dept: LAB | Facility: CLINIC | Age: 50
End: 2025-07-09
Payer: COMMERCIAL

## 2025-07-09 VITALS
SYSTOLIC BLOOD PRESSURE: 121 MMHG | HEART RATE: 58 BPM | BODY MASS INDEX: 29.3 KG/M2 | OXYGEN SATURATION: 98 % | HEIGHT: 64 IN | WEIGHT: 171.6 LBS | DIASTOLIC BLOOD PRESSURE: 80 MMHG

## 2025-07-09 DIAGNOSIS — E66.3 OVERWEIGHT WITH BODY MASS INDEX (BMI) OF 29 TO 29.9 IN ADULT: Primary | ICD-10-CM

## 2025-07-09 DIAGNOSIS — Z86.39 HISTORY OF OBESITY: ICD-10-CM

## 2025-07-09 DIAGNOSIS — M54.50 LUMBAR PAIN: ICD-10-CM

## 2025-07-09 DIAGNOSIS — E66.3 OVERWEIGHT WITH BODY MASS INDEX (BMI) OF 29 TO 29.9 IN ADULT: ICD-10-CM

## 2025-07-09 LAB
ANION GAP SERPL CALCULATED.3IONS-SCNC: 10 MMOL/L (ref 7–15)
BUN SERPL-MCNC: 10.5 MG/DL (ref 6–20)
CALCIUM SERPL-MCNC: 9.3 MG/DL (ref 8.8–10.4)
CHLORIDE SERPL-SCNC: 105 MMOL/L (ref 98–107)
CREAT SERPL-MCNC: 0.64 MG/DL (ref 0.51–0.95)
EGFRCR SERPLBLD CKD-EPI 2021: >90 ML/MIN/1.73M2
GLUCOSE SERPL-MCNC: 90 MG/DL (ref 70–99)
HCO3 SERPL-SCNC: 24 MMOL/L (ref 22–29)
POTASSIUM SERPL-SCNC: 4 MMOL/L (ref 3.4–5.3)
SODIUM SERPL-SCNC: 139 MMOL/L (ref 135–145)

## 2025-07-09 PROCEDURE — 36415 COLL VENOUS BLD VENIPUNCTURE: CPT

## 2025-07-09 PROCEDURE — 80048 BASIC METABOLIC PNL TOTAL CA: CPT

## 2025-07-09 PROCEDURE — 99213 OFFICE O/P EST LOW 20 MIN: CPT | Performed by: PHYSICIAN ASSISTANT

## 2025-07-09 PROCEDURE — 3074F SYST BP LT 130 MM HG: CPT | Performed by: PHYSICIAN ASSISTANT

## 2025-07-09 PROCEDURE — 3079F DIAST BP 80-89 MM HG: CPT | Performed by: PHYSICIAN ASSISTANT

## 2025-07-09 PROCEDURE — G2211 COMPLEX E/M VISIT ADD ON: HCPCS | Performed by: PHYSICIAN ASSISTANT

## 2025-07-09 NOTE — PROGRESS NOTES
Return Medical Weight Management Note         Janet Grey  MRN:  [id number]  :  1975  DARREN:  2025        Dear Physician No Ref-Primary,    I had the pleasure of seeing your patient Janet Grey. She is a 50 year old female who I am continuing to see for treatment of obesity related to:        2023     1:51 PM   --   I have the following health issues associated with obesity None of the above   I have the following symptoms associated with obesity None of the above         Assessment   Problem List Items Addressed This Visit       Overweight with body mass index (BMI) of 29 to 29.9 in adult - Primary    Relevant Orders    Basic metabolic panel    History of obesity    Relevant Orders    Basic metabolic panel    Lumbar pain    Relevant Orders    Basic metabolic panel          PLAN/DISCUSSION:  Congratulated patient on overall weight loss and lifestyle changes. Emphasized the importance of diet and activity for long term success .  Current dose seem adequate for appetite suppression     Plan:  Get lab  Continue zepbound 5 mg     Upon review of lab will send over 3 months of zepbound 5 mg.     FOLLOW-UP:  October      SUBJECTIVE/OBJECTIVE:  Patient last seen 2025 and was started on zepbound. Is now on the 5 mg dose. Overall doing well. Eats about half of what she used to eat. Not snacking at all. Very happy with the medication and weight loss. Had some heartburn initially but not anymore. No constipation or nausea.     Getting  in September and will be going to [place] in October! Plans are coming together nicely.     Anti-obesity medications:   Current: zepbound 5mg on    Side effects:  Denies nausea, vomiting, abdominal pain, severe constipation, diarrhea, or heartburn       Antiobesity medication history:  Phentermine (Lomaira, Adipex) Rx provided at lnitial visit., Got jittery   Phentermine/Topiramate (Qsymia)     Bupropion/Naltrexone (Contrave) Took for 3.5  weeks developed tinnitus   Liraglutide (Saxenda)     Semaglutide (Wegovy)  Patient has no history of pancreatitis. Patient has no personal or family history of medullary thyroid carcinoma or MEN2.     Tirzepatide (Zepbound)  Had a benign pancreatic cyst. Per patient pancreatic specialist is not concerned about patient taking GLP-1 as cyst is not cancerous.    Orlistat (Xenical/Geraldo)     Off-Label Medications for Obesity  Semaglutide (Ozempic)     Tirzepatide (Mounjaro)     Bupropion (Wellbutrin)     Metformin(Glucophage) Good option    Topiramate (Topamax) Good option    Naltexone Option               7/3/2025    10:15 AM   Weight Loss Medication History Reviewed With Patient   Are you having any side effects from the weight loss medication that we have prescribed you? No       Recent diet changes: Drinks 80 oz water daily. 4 glasses of wine weekly   B: Yogurt shakes - 23 grams of protein  L: chicken link with a small salad   D: Stromboli and a salad  No snacking.      Recent exercise/activity changes: walks daily 2-3 miles. Started strength training twice weekly     Recent stressors: middle of the road      CURRENT WEIGHT:   171 lbs 9.6 oz    Initial Weight (lbs): 180 lbs  Last Visits Weight: 183 lb (83 kg)  Cumulative weight loss (lbs): 8.4  Weight Loss Percentage: 4.67%        7/3/2025    10:15 AM   Changes and Difficulties   I have made the following changes to my diet since my last visit: I eat breakfast- and have been making sure I have more protein. Making better choices  in general   With regards to my diet, I am still struggling with: I like to snack.   I have made the following changes to my activity/exercise since my last visit: I walk 2-3 miles a day and do weights 2x a week         MEDICATIONS:   Current Outpatient Medications   Medication Sig Dispense Refill    levonorgestrel (MIRENA) 52 MG (20 mcg/day) IUD 52 mg by Intrauterine route      multivitamin w/minerals (MULTI-VITAMIN) tablet Take 1 tablet by  "mouth      ZEPBOUND 5 MG/0.5ML prefilled pen ADMINISTER 5 MG UNDER THE SKIN EVERY 7 DAYS 2 mL 1    tirzepatide-Weight Management (ZEPBOUND) 2.5 MG/0.5ML prefilled pen Inject 0.5 mLs (2.5 mg) subcutaneously every 7 days. (Patient not taking: Reported on 7/9/2025) 2 mL 0         ROS:  General  Fatigue: No  Sleep Quality: OK      PHYSICAL EXAM:  Objective    /80   Pulse 58   Ht 5' 4\" (1.626 m)   Wt 171 lb 9.6 oz (77.8 kg)   SpO2 98%   BMI 29.46 kg/m    GENERAL: Healthy, alert and no distress  EYES: Eyes grossly normal to inspection.  No discharge or erythema, or obvious scleral/conjunctival abnormalities.  RESP: No audible wheeze, cough, or visible cyanosis.  No visible retractions or increased work of breathing.    SKIN: Visible skin clear. No significant rash, abnormal pigmentation or lesions.  NEURO: Cranial nerves grossly intact.  Mentation and speech appropriate for age.  PSYCH: Mentation appears normal, affect normal/bright, judgement and insight intact, normal speech and appearance well-groomed.        Sincerely,    Avinash Beverly PA-C      20 minutes spent on the date of the encounter doing chart review, review of test results, patient visit and documentation       "

## 2025-07-09 NOTE — PATIENT INSTRUCTIONS
"Nice to talk with you today! Thank you for allowing me the privilege of caring for you.   We hope we provided you with the excellent service you deserve.     To ensure the quality of our services you may receive a patient satisfaction survey from an independent monitoring company.  The greatest compliment you can give is \"Likely to Recommend\"      Below is our plan we discussed.-  MARLEY Da Silva      Get lab  Continue zepbound 5 mg     Please schedule a follow up with Avinash Beverly PA-C in 3 months.  If you need to reach me sooner you can do so by calling [phone].    Have a great day!     Eat Better ? Move More ? Live Well    Eat 3 nutrient-rich meals each day    Don t skip meals--it will cause you to overeat later in the day!    Eating fiber (vegetables/fruits/whole grains) and protein with meals helps you stay full longer    Choose foods with less than 10 grams of sugar and 5 grams of fat per serving to prevent excess calories and weight gain  Eat around the same times each day to develop a routine eating schedule   Avoid snacking unless physically hungry.   Planned snacks: 1-2 times per day and no more than 150 calories    Eat protein first   Protein helps with healing, maintaining adequate muscle mass, reducing hunger and optimizing nutritional status   Aim for 60-80 grams of protein per day   Fill up on Fiber   Fiber comes from plants--fruits, veggies, whole grains, nuts/seeds and beans   Fiber is low in calories, high in phytonutrients and helps you stay full longer   Aim for 25-35 grams per day by eating fiber with meals and snacks  Eat [id number]   Take 20-30 minutes to eat each meal by taking small bites, chewing foods to applesauce consistency or 20-30 times before you swallow   Eating foods too fast can delay satiety/fullness signals and increase overeating   Slow down your eating by using toddler utensils, putting your fork/spoon down between bites and not watching TV or emailing during meals!   Keep " a Journal         Writing down what you eat, how you feel and when you are active helps you identify new changes to work on from week to week         Look for ways to cut 100 calories from your current diet 2-3 times per day  Drink 64 ounces of 0-Calorie drinks between meals   Water   Zero calorie Propel  or Vitamin Water     [employer]  Zero Calories   Crystal Light , Sugar-Free David-Aid , and other sugar-free lemonade or flavored foster   Keep Caffeine to less than 300mg per day ie: 3-6oz cups coffee     Work up to 45-60 minutes of physical activity most days of the week   Helps with losing weight and prevent regaining those extra pounds!    Do a combo of cardio (walking/water exercises) and strength training (lifting weights/Vinyasa yoga)    Avoid Mindless Eating   Be present when you eat--take note of the smell, taste and quality of your food   Make a list of alternative activities you could do to prevent eating out of boredom/stress  Go for a walk, call a friend, chew gum, paint your nails, re-organize the garage, etc

## 2025-08-25 ENCOUNTER — TELEPHONE (OUTPATIENT)
Dept: SURGERY | Facility: CLINIC | Age: 50
End: 2025-08-25
Payer: COMMERCIAL